# Patient Record
Sex: MALE | Race: WHITE | Employment: OTHER | URBAN - METROPOLITAN AREA
[De-identification: names, ages, dates, MRNs, and addresses within clinical notes are randomized per-mention and may not be internally consistent; named-entity substitution may affect disease eponyms.]

---

## 2017-05-09 PROBLEM — F98.8 ATTENTION DEFICIT DISORDER, UNSPECIFIED HYPERACTIVITY PRESENCE: Status: ACTIVE | Noted: 2017-05-09

## 2017-10-17 PROCEDURE — 87081 CULTURE SCREEN ONLY: CPT | Performed by: INTERNAL MEDICINE

## 2018-11-28 PROCEDURE — 81003 URINALYSIS AUTO W/O SCOPE: CPT | Performed by: INTERNAL MEDICINE

## 2018-11-28 PROCEDURE — 84402 ASSAY OF FREE TESTOSTERONE: CPT | Performed by: INTERNAL MEDICINE

## 2018-11-28 PROCEDURE — 84403 ASSAY OF TOTAL TESTOSTERONE: CPT | Performed by: INTERNAL MEDICINE

## 2019-03-14 PROBLEM — R05.9 COUGH: Status: ACTIVE | Noted: 2019-03-14

## 2019-04-04 PROBLEM — R91.8 MASS OF LEFT LUNG: Status: ACTIVE | Noted: 2019-04-04

## 2019-04-05 PROCEDURE — 82164 ANGIOTENSIN I ENZYME TEST: CPT | Performed by: INTERNAL MEDICINE

## 2019-04-11 PROCEDURE — 86606 ASPERGILLUS ANTIBODY: CPT | Performed by: INTERNAL MEDICINE

## 2019-04-11 PROCEDURE — 86641 CRYPTOCOCCUS ANTIBODY: CPT | Performed by: INTERNAL MEDICINE

## 2019-04-11 PROCEDURE — 86480 TB TEST CELL IMMUN MEASURE: CPT | Performed by: INTERNAL MEDICINE

## 2019-04-11 PROCEDURE — 86635 COCCIDIOIDES ANTIBODY: CPT | Performed by: INTERNAL MEDICINE

## 2019-04-11 PROCEDURE — 86331 IMMUNODIFFUSION OUCHTERLONY: CPT | Performed by: INTERNAL MEDICINE

## 2019-04-11 PROCEDURE — 86612 BLASTOMYCES ANTIBODY: CPT | Performed by: INTERNAL MEDICINE

## 2019-04-11 PROCEDURE — 82785 ASSAY OF IGE: CPT | Performed by: INTERNAL MEDICINE

## 2019-04-11 PROCEDURE — 86698 HISTOPLASMA ANTIBODY: CPT | Performed by: INTERNAL MEDICINE

## 2019-06-04 PROBLEM — L29.9 ITCHING: Status: ACTIVE | Noted: 2019-06-04

## 2019-08-19 PROBLEM — R05.9 COUGH: Status: RESOLVED | Noted: 2019-03-14 | Resolved: 2019-08-19

## 2019-08-19 PROBLEM — L29.9 ITCHING: Status: RESOLVED | Noted: 2019-06-04 | Resolved: 2019-08-19

## 2019-10-14 ENCOUNTER — APPOINTMENT (OUTPATIENT)
Dept: SPEECH THERAPY | Age: 54
End: 2019-10-14
Attending: OTOLARYNGOLOGY
Payer: COMMERCIAL

## 2019-10-28 ENCOUNTER — APPOINTMENT (OUTPATIENT)
Dept: SPEECH THERAPY | Age: 54
End: 2019-10-28
Attending: OTOLARYNGOLOGY
Payer: COMMERCIAL

## 2019-10-28 ENCOUNTER — HOSPITAL ENCOUNTER (OUTPATIENT)
Dept: SPEECH THERAPY | Facility: HOSPITAL | Age: 54
Setting detail: THERAPIES SERIES
Discharge: HOME | End: 2019-10-28
Attending: INTERNAL MEDICINE
Payer: COMMERCIAL

## 2019-10-28 DIAGNOSIS — R13.12 OROPHARYNGEAL DYSPHAGIA: ICD-10-CM

## 2019-10-28 PROCEDURE — 92610 EVALUATE SWALLOWING FUNCTION: CPT

## 2019-10-28 NOTE — PROGRESS NOTES
ADULT SWALLOWING EVALUATION:   Referring Physician: Dr. Charlotte Jones  Diagnosis:  Oropharyngeal dysphagia (R13.12)     Date of Service: 10/28/2019   Clinical swallow evaluation completed per MD order.   Patient arrived to session on time and appeared to be anxi amphetamine-dextroamphetamine 20 MG Oral Tab, Take 1 tablet (20 mg total) by mouth 4 (four) times daily. , Disp: 30 tablet, Rfl: 0  [START ON 11/3/2019] amphetamine-dextroamphetamine 20 MG Oral Tab, Take 1 tablet (20 mg total) by mouth 4 (four) times daily. clinical s/s of penetration/aspiration (ie: throat-clearing, cough, and wet/gurgly voice). Patient would benefit from skilled dysphagia therapy to address the above impairments in order to improve functional deficits of pharyngeal phase of swallow.   Wilhemena Blake swallowing strategies. STG 2: Patient will tolerate diet recommendation with decreased incidence of clinical s/s of aspiration reported.   STG 3: Patient will use aspiration precautions/compensatory strategies with 100% accuracy given min verbal/visual cue

## 2019-11-04 ENCOUNTER — HOSPITAL ENCOUNTER (OUTPATIENT)
Dept: SPEECH THERAPY | Facility: HOSPITAL | Age: 54
Setting detail: THERAPIES SERIES
Discharge: HOME | End: 2019-11-04
Attending: INTERNAL MEDICINE
Payer: COMMERCIAL

## 2019-11-04 ENCOUNTER — APPOINTMENT (OUTPATIENT)
Dept: SPEECH THERAPY | Age: 54
End: 2019-11-04
Attending: OTOLARYNGOLOGY
Payer: COMMERCIAL

## 2019-11-04 PROCEDURE — 92526 ORAL FUNCTION THERAPY: CPT

## 2019-11-04 NOTE — PROGRESS NOTES
Treatment #2 (PPO; PN due 1/26/20)    Treatment Time: 60 minutes  Precautions: aspiration     Charges: 1 billed ([id number])  Pain: 0/10      Diagnosis: Oropharyngeal dysphagia (R13.12)                   Subjective: Patient arrived to session on time.   He appea muscle-strengthening exercises to improve motility of swallow mechanism.

## 2019-11-11 ENCOUNTER — APPOINTMENT (OUTPATIENT)
Dept: SPEECH THERAPY | Age: 54
End: 2019-11-11
Attending: OTOLARYNGOLOGY
Payer: COMMERCIAL

## 2019-11-18 ENCOUNTER — APPOINTMENT (OUTPATIENT)
Dept: SPEECH THERAPY | Facility: HOSPITAL | Age: 54
End: 2019-11-18
Attending: OTOLARYNGOLOGY
Payer: COMMERCIAL

## 2019-11-18 ENCOUNTER — APPOINTMENT (OUTPATIENT)
Dept: SPEECH THERAPY | Facility: HOSPITAL | Age: 54
End: 2019-11-18
Payer: COMMERCIAL

## 2019-11-18 ENCOUNTER — APPOINTMENT (OUTPATIENT)
Dept: SPEECH THERAPY | Age: 54
End: 2019-11-18
Attending: OTOLARYNGOLOGY
Payer: COMMERCIAL

## 2019-11-25 ENCOUNTER — APPOINTMENT (OUTPATIENT)
Dept: SPEECH THERAPY | Age: 54
End: 2019-11-25
Attending: OTOLARYNGOLOGY
Payer: COMMERCIAL

## 2019-11-26 ENCOUNTER — APPOINTMENT (OUTPATIENT)
Dept: SPEECH THERAPY | Facility: HOSPITAL | Age: 54
End: 2019-11-26
Attending: INTERNAL MEDICINE
Payer: COMMERCIAL

## 2020-10-20 PROBLEM — F90.9 ADHD: Status: ACTIVE | Noted: 2017-05-09
